# Patient Record
Sex: MALE | Race: WHITE | NOT HISPANIC OR LATINO | URBAN - METROPOLITAN AREA
[De-identification: names, ages, dates, MRNs, and addresses within clinical notes are randomized per-mention and may not be internally consistent; named-entity substitution may affect disease eponyms.]

---
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
## 2021-09-14 ENCOUNTER — HOSPITAL ENCOUNTER (OUTPATIENT)
Facility: MEDICAL CENTER | Age: 21
End: 2021-09-14
Attending: PHYSICIAN ASSISTANT
Payer: COMMERCIAL

## 2021-09-14 ENCOUNTER — OFFICE VISIT (OUTPATIENT)
Dept: URGENT CARE | Facility: CLINIC | Age: 21
End: 2021-09-14
Payer: COMMERCIAL

## 2021-09-14 VITALS
OXYGEN SATURATION: 99 % | RESPIRATION RATE: 16 BRPM | HEIGHT: 70 IN | TEMPERATURE: 99.6 F | DIASTOLIC BLOOD PRESSURE: 72 MMHG | SYSTOLIC BLOOD PRESSURE: 112 MMHG | WEIGHT: 124 LBS | HEART RATE: 91 BPM | BODY MASS INDEX: 17.75 KG/M2

## 2021-09-14 DIAGNOSIS — R05.9 COUGH: ICD-10-CM

## 2021-09-14 DIAGNOSIS — R19.7 DIARRHEA, UNSPECIFIED TYPE: ICD-10-CM

## 2021-09-14 PROCEDURE — 99203 OFFICE O/P NEW LOW 30 MIN: CPT | Mod: CS | Performed by: PHYSICIAN ASSISTANT

## 2021-09-14 PROCEDURE — U0003 INFECTIOUS AGENT DETECTION BY NUCLEIC ACID (DNA OR RNA); SEVERE ACUTE RESPIRATORY SYNDROME CORONAVIRUS 2 (SARS-COV-2) (CORONAVIRUS DISEASE [COVID-19]), AMPLIFIED PROBE TECHNIQUE, MAKING USE OF HIGH THROUGHPUT TECHNOLOGIES AS DESCRIBED BY CMS-2020-01-R: HCPCS

## 2021-09-14 PROCEDURE — U0005 INFEC AGEN DETEC AMPLI PROBE: HCPCS

## 2021-09-14 ASSESSMENT — ENCOUNTER SYMPTOMS
MYALGIAS: 1
COUGH: 1
VOMITING: 0
HEADACHES: 1
SORE THROAT: 0
RHINORRHEA: 1
ABDOMINAL PAIN: 0
NAUSEA: 0
FEVER: 0
DIARRHEA: 1
CARDIOVASCULAR NEGATIVE: 1
CHILLS: 0
WHEEZING: 0
SINUS PAIN: 0
SHORTNESS OF BREATH: 0

## 2021-09-15 DIAGNOSIS — R19.7 DIARRHEA, UNSPECIFIED TYPE: ICD-10-CM

## 2021-09-15 DIAGNOSIS — R05.9 COUGH: ICD-10-CM

## 2021-09-15 LAB
AMBIGUOUS DTTM AMBI4: NORMAL
COVID ORDER STATUS COVID19: NORMAL
SARS-COV-2 RNA RESP QL NAA+PROBE: NOTDETECTED
SPECIMEN SOURCE: NORMAL

## 2021-09-15 NOTE — PROGRESS NOTES
"Subjective     Harry Ross is a 20 y.o. male who presents with GI Problem (trouble eating full portions, dark \"black\" bowel movement today.)            Cough  This is a new problem. The current episode started in the past 7 days. The problem has been unchanged. The problem occurs every few minutes. The cough is non-productive. Associated symptoms include headaches, myalgias and rhinorrhea. Pertinent negatives include no chest pain, chills, ear pain, fever, nasal congestion, postnasal drip, sore throat, shortness of breath or wheezing. He has tried OTC cough suppressant (Motrin, Vitamins) for the symptoms. The treatment provided mild relief. There is no history of asthma or pneumonia.       PMH:  has a past medical history of Dyspepsia and No known health problems.  MEDS:   Current Outpatient Medications:   •  multivitamin (THERAGRAN) Tab, Take 1 Tab by mouth every day. (Patient not taking: Reported on 9/14/2021), Disp: , Rfl:   •  omeprazole (PRILOSEC) 40 MG delayed-release capsule, Take 1 Cap by mouth every day. (Patient not taking: Reported on 9/14/2021), Disp: 30 Cap, Rfl: 2  •  clindamycin-benzoyl peroxide (BENZACLIN) gel, Apply to clean, dry face twice a day (Patient not taking: Reported on 9/14/2021), Disp: 50 g, Rfl: 5  •  acetaminophen (TYLENOL) 325 MG TABS, Take 650 mg by mouth every four hours as needed. (Patient not taking: Reported on 9/14/2021), Disp: , Rfl:   ALLERGIES: No Known Allergies  SURGHX: No past surgical history on file.  SOCHX:  reports that he has never smoked. He has never used smokeless tobacco. He reports that he does not drink alcohol and does not use drugs.  FH: family history is not on file.    Review of Systems   Constitutional: Negative for chills and fever.   HENT: Positive for congestion and rhinorrhea. Negative for ear pain, postnasal drip, sinus pain and sore throat.    Respiratory: Positive for cough. Negative for shortness of breath and wheezing.    Cardiovascular: Negative. " " Negative for chest pain.   Gastrointestinal: Positive for diarrhea. Negative for abdominal pain, nausea and vomiting.   Musculoskeletal: Positive for myalgias.   Neurological: Positive for headaches.       Medications, Allergies, and current problem list reviewed today in Epic           Objective     /72   Pulse 91   Temp 37.6 °C (99.6 °F) (Temporal)   Resp 16   Ht 1.778 m (5' 10\")   Wt 56.2 kg (124 lb)   SpO2 99%   BMI 17.79 kg/m²      Physical Exam  Vitals and nursing note reviewed.   Constitutional:       General: He is not in acute distress.     Appearance: Normal appearance. He is well-developed. He is not ill-appearing, toxic-appearing or diaphoretic.   HENT:      Head: Normocephalic and atraumatic.      Right Ear: Tympanic membrane, ear canal and external ear normal.      Left Ear: Tympanic membrane, ear canal and external ear normal.      Nose: Nose normal. No congestion or rhinorrhea.      Mouth/Throat:      Mouth: Mucous membranes are moist.      Pharynx: Oropharynx is clear. No oropharyngeal exudate or posterior oropharyngeal erythema.   Eyes:      General:         Right eye: No discharge.         Left eye: No discharge.      Conjunctiva/sclera: Conjunctivae normal.   Cardiovascular:      Rate and Rhythm: Normal rate and regular rhythm.      Pulses: Normal pulses.      Heart sounds: Normal heart sounds. No murmur heard.     Pulmonary:      Effort: Pulmonary effort is normal. No respiratory distress.      Breath sounds: Normal breath sounds. No wheezing, rhonchi or rales.   Chest:      Chest wall: No tenderness.   Abdominal:      General: Abdomen is flat. Bowel sounds are normal. There is no distension.      Palpations: Abdomen is soft.      Tenderness: There is no abdominal tenderness. There is no right CVA tenderness, left CVA tenderness, guarding or rebound.   Musculoskeletal:         General: No swelling or tenderness.      Cervical back: Normal range of motion and neck supple.      Right " lower leg: No edema.      Left lower leg: No edema.   Lymphadenopathy:      Cervical: No cervical adenopathy.   Skin:     General: Skin is warm and dry.   Neurological:      Mental Status: He is alert and oriented to person, place, and time.   Psychiatric:         Mood and Affect: Mood normal.         Behavior: Behavior normal.         Thought Content: Thought content normal.         Judgment: Judgment normal.                             Assessment & Plan         1. Cough  SARS-CoV-2, PCR (In-House): Collect NP OR nasal swab in VTM   2. Diarrhea, unspecified type  SARS-CoV-2, PCR (In-House): Collect NP OR nasal swab in VTM     Cold-like symptoms for 1 week.  Friend was sick with same symptoms and has resolved.  Patient still having lingering upper respiratory symptoms.  Today he did have 1 dark-colored diarrhea.  No bright red blood.  No abdominal pain, fever or vomiting.  Patient does not drink or smoke.  No risk factors.  Vital signs normal and exam is benign.  Covid testing initiated.  To ER for worsening abdominal symptoms.  OTC meds and conservative measures as discussed    Return to clinic or go to ED if symptoms worsen or persist. Indications for ED discussed at length. Patient/Parent/Guardian voices understanding. Follow-up with your primary care provider in 3-5 days. Red flag symptoms discussed. All side effects of medication discussed including allergic response, GI upset, tendon injury, rash, sedation etc.    Please note that this dictation was created using voice recognition software. I have made every reasonable attempt to correct obvious errors, but I expect that there are errors of grammar and possibly content that I did not discover before finalizing the note.